# Patient Record
(demographics unavailable — no encounter records)

---

## 2025-02-15 NOTE — ASSESSMENT
[FreeTextEntry1] : 1) Diabetes: discussed importance of glycemic control, check feet daily for any new lesions, use of proper fit shoes with accommodative insoles 2) Left heel wound: aseptic excisional sharp debridement performed to remove devitalized tissue, cleansed with alcohol, applied bacitracin, educated patient of daily wound care with bacitracin/kerasal and to return to office immediately if any new lesions or if condition worsens, patient relayed full understanding to plan of care, recommended use of Tuli's heel protectors, no clinical signs of infection 3) Right heel wound: aseptic excisional sharp debridement performed to remove devitalized tissue, cleansed with alcohol, applied bacitracin, educated patient of daily wound care with bacitracin/kerasal and to return to office immediately if any new lesions or if condition worsens, patient relayed full understanding to plan of care, recommended use of Tuli's heel protectors, no clinical signs of infection 4) Callus/corn: aseptic debridement of Left dorsal ankle callus/corn x 2, apply topical kerasal daily 5) Onychomycosis: continue topical antifungal 6) Tinea pedis: Rx clotrimazole-betamethasone cream BID 7) Hammertoes: discussed use of proper fit high toe box shoes with accommodative insoles/orthotics, check feet for any new lesions daily, return to office immediately if present. Patient was 3D scanned for custom orthotics today.  Patient tolerated all treatments well and without any complications [Verbal] : verbal [Patient] : patient [Good - alert, interested, motivated] : Good - alert, interested, motivated [Verbalizes knowledge/Understanding] : Verbalizes knowledge/understanding [Foot Care] : foot care [Skin Care] : skin care [Pressure relief] : pressure relief [Off-loading] : off-loading [Pt responsibility to plan of care] : patient responsibility to plan of care [Glycemic Control] : glycemic control

## 2025-02-15 NOTE — REASON FOR VISIT
[Initial Visit] : an initial visit for [FreeTextEntry2] : diabetic routine foot care and evaluation, painful bilateral heel fissure wounds, onychomycosis, tinea pedis, hammertoes, calluses to Left dorsal ankle region

## 2025-02-15 NOTE — HISTORY OF PRESENT ILLNESS
[FreeTextEntry1] : Mr. LOGAN ROBERTS is a 69 year M who is seen in the office for diabetic routine foot care and evaluation, painful bilateral heel fissure wounds, onychomycosis, tinea pedis, hammertoes, calluses to Left dorsal ankle region. Patient denies any current or recent f/c/n/v/sob/chest pain. AAOx3 and in NAD.

## 2025-02-15 NOTE — PHYSICAL EXAM
[General Appearance - Alert] : alert [General Appearance - In No Acute Distress] : in no acute distress [General Appearance - Well Nourished] : well nourished [General Appearance - Well Developed] : well developed [General Appearance - Well-Appearing] : healthy appearing [] : normal voice and communication [Delayed in the Right Toes] : capillary refills normal in right toes [Delayed in the Left Toes] : capillary refills normal in the left toes [2+] : left foot dorsalis pedis 2+ [Skin Turgor] : normal skin turgor [Foot Ulcer] : foot ulcer [FreeTextEntry1] : heel [de-identified] : bacitracin, kerasal [FreeTextEntry7] : heel [de-identified] : bacitracin, kerasal [TWNoteComboBox1] : Left [TWNoteComboBox2] : 2 [TWNoteComboBox3] : PT [TWNoteComboBox4] : None [TWNoteComboBox5] : No [TWNoteComboBox6] : Pressure [de-identified] : No [de-identified] : Normal [de-identified] : None [de-identified] : <20% [de-identified] : >75% [de-identified] : No [TWNoteComboBox7] : Diana [de-identified] : Debridement excisional [de-identified] : Daily [TWNoteComboBox9] : Right [de-identified] : 2 [de-identified] : PT [de-identified] : None [de-identified] : No [de-identified] : Pressure [de-identified] : No [de-identified] : Normal [de-identified] : None [de-identified] : >75% [de-identified] : <20% [de-identified] : No [TWNoteComboBox8] : Diana [de-identified] : Debridement excisional [de-identified] : Daily [Oriented To Time, Place, And Person] : oriented to person, place, and time [Impaired Insight] : insight and judgment were intact [Affect] : the affect was normal

## 2025-04-18 NOTE — PHYSICAL EXAM
[General Appearance - Alert] : alert [General Appearance - In No Acute Distress] : in no acute distress [General Appearance - Well Nourished] : well nourished [General Appearance - Well Developed] : well developed [General Appearance - Well-Appearing] : healthy appearing [Delayed in the Right Toes] : capillary refills normal in right toes [Delayed in the Left Toes] : capillary refills normal in the left toes [2+] : left foot dorsalis pedis 2+ [] : normal strength/tone [de-identified] : ROM of ankle, subtalar, midtarsal, MPJ, IPJ are adequate and nontender bilateral 5/5 muscle power in inversion, eversion, dorsiflexion, plantarflexion bilateral [Skin Turgor] : normal skin turgor [Foot Ulcer] : foot ulcer [FreeTextEntry2] : 1.0 cm [FreeTextEntry3] : 0.1 cm [FreeTextEntry5] : with #15 blade [de-identified] : HORTENCIA Fowler [FreeTextEntry7] : heel [FreeTextEntry8] : 1.6 cm [FreeTextEntry9] : 0.1 cm [FreeTextEntry6] : with #15 blade [de-identified] : HORTENCIA Fowler [TWNoteComboBox1] : Left [TWNoteComboBox2] : 2 [TWNoteComboBox3] : PT [TWNoteComboBox4] : None [TWNoteComboBox5] : No [TWNoteComboBox6] : Pressure [de-identified] : No [de-identified] : Normal [de-identified] : None [de-identified] : <20% [de-identified] : >75% [de-identified] : No [TWNoteComboBox7] : Diana [de-identified] : Debridement excisional [de-identified] : Daily [TWNoteComboBox9] : Right [de-identified] : 2 [de-identified] : PT [de-identified] : None [de-identified] : No [de-identified] : Pressure [de-identified] : No [de-identified] : Normal [de-identified] : None [de-identified] : <20% [de-identified] : >75% [de-identified] : No [TWNoteComboBox8] : Diana [de-identified] : Debridement excisional [de-identified] : Daily [de-identified] : Primary Dressing [Position Sense Dec.] : diminished position sense at the level of the toes [FreeTextEntry1] : gross epicritic sensation to feet diminished, light touch sensation diminished, sharp/dull sensation intact [Oriented To Time, Place, And Person] : oriented to person, place, and time [Impaired Insight] : insight and judgment were intact [Affect] : the affect was normal

## 2025-04-18 NOTE — HISTORY OF PRESENT ILLNESS
[FreeTextEntry1] : Mr. LOGAN ROBERTS is a 69 year male who is seen in the office for diabetic foot care, painful bilateral heel fissure wounds, onychomycosis, tinea pedis, hammertoes, callus to Left dorsal ankle. Patient denies any current or recent fever, chills, nausea, vomiting, SOB, or chest pain. AAOx3 and in NAD. Patient mentions his last HbA1c is 7.2%

## 2025-04-18 NOTE — ASSESSMENT
[FreeTextEntry1] : 1) Left heel wound: aseptic excisional sharp debridement performed with #15 blade to remove devitalized tissue, cleansed with alcohol, applied bacitracin, DSD educated patient of daily wound care with BriceylJAZMYNED and to return to office immediately if any new lesions or if condition worsens, patient relayed full understanding to plan of care, recommended use of Tuli's heel protectors/gel heel sleeves 2) Right heel wound: aseptic excisional sharp debridement performed with #15 blade to remove devitalized tissue, cleansed with alcohol, applied bacitracin, DSD educated patient of daily wound care with Santyl DSD and to return to office immediately if any new lesions or if condition worsens, patient relayed full understanding to plan of care, recommended use of Tuli's heel protectors/gel heel sleeves 3) Callus: aseptic debridement of painful foot callus x 1 (Left dorsal ankle), patient given and applied offloading donut hole relief felt pad, continue topical Kerasal 4) Diabetes: discussed importance of glycemic control, check feet daily for any new lesions, use of proper fit shoes with accommodative insoles 5) Onychomycosis: stable at this time, continue topical antifungal 6) Tinea pedis: continue clotrimazole-betamethasone cream BID 7) Hammertoes: discussed use of proper fit high toe box shoes with accommodative insoles/orthotics, check feet for any new lesions daily, return to office immediately if present.   Patient tolerated all treatments well and without any complications Follow up in 1 week [Verbal] : verbal [Patient] : patient [Good - alert, interested, motivated] : Good - alert, interested, motivated [Verbalizes knowledge/Understanding] : Verbalizes knowledge/understanding [Dressing changes] : dressing changes [Pt responsibility to plan of care] : patient responsibility to plan of care [Glycemic Control] : glycemic control

## 2025-04-18 NOTE — REASON FOR VISIT
[Follow-Up Visit] : a follow-up visit for [FreeTextEntry2] : diabetic foot care, painful bilateral heel fissure wounds, onychomycosis, tinea pedis, hammertoes, callus to Left dorsal ankle.

## 2025-04-18 NOTE — REVIEW OF SYSTEMS
[Joint Swelling] : no joint swelling [Joint Stiffness] : no joint stiffness [Limb Swelling] : no limb swelling [Skin Lesions] : skin lesion [Skin Wound] : skin wound [Itching] : no itching [Dry Skin] : dry skin [Confused] : no confusion [Convulsions] : no convulsions [Dizziness] : no dizziness [Fainting] : no fainting [Negative] : Psychiatric